# Patient Record
Sex: MALE | Race: BLACK OR AFRICAN AMERICAN | URBAN - METROPOLITAN AREA
[De-identification: names, ages, dates, MRNs, and addresses within clinical notes are randomized per-mention and may not be internally consistent; named-entity substitution may affect disease eponyms.]

---

## 2020-08-31 ENCOUNTER — EMERGENCY (EMERGENCY)
Facility: HOSPITAL | Age: 45
LOS: 1 days | Discharge: HOME | End: 2020-08-31
Attending: EMERGENCY MEDICINE | Admitting: EMERGENCY MEDICINE
Payer: OTHER MISCELLANEOUS

## 2020-08-31 VITALS
OXYGEN SATURATION: 96 % | WEIGHT: 179.9 LBS | DIASTOLIC BLOOD PRESSURE: 91 MMHG | RESPIRATION RATE: 18 BRPM | SYSTOLIC BLOOD PRESSURE: 128 MMHG | TEMPERATURE: 99 F | HEART RATE: 92 BPM

## 2020-08-31 PROCEDURE — 99284 EMERGENCY DEPT VISIT MOD MDM: CPT

## 2020-08-31 NOTE — ED PROVIDER NOTE - PATIENT PORTAL LINK FT
You can access the FollowMyHealth Patient Portal offered by [facility] by registering at the following website: [email or web address]. By joining [facility] FollowMyHealth portal, you will also be able to view your health information using other applications (apps) compatible with our system.

## 2020-08-31 NOTE — ED PROVIDER NOTE - OBJECTIVE STATEMENT
45 y/o male with no pertinent PMHx or smoking history. Patient is a , and someone in his unit recently tested positive for COVID. Patient does not know who this person is, so he is not sure whether he has been in contact with them, but was told to get tested. Three days ago, Patient developed a "scratchy" throat and runny nose. Denies fever, chills, chest pain, SOB. No other known sick contacts. Entire household is well. No travel history. Has been taking Sudafed with relief in symptoms.

## 2020-08-31 NOTE — ED ADULT NURSE NOTE - NSIMPLEMENTINTERV_GEN_ALL_ED
Implemented All Universal Safety Interventions:  [place] to call system. Call bell, personal items and telephone within reach. Instruct patient to call for assistance. Room bathroom lighting operational. Non-slip footwear when patient is off stretcher. Physically safe environment: no spills, clutter or unnecessary equipment. Stretcher in lowest position, wheels locked, appropriate side rails in place.

## 2020-08-31 NOTE — ED PROVIDER NOTE - NSFOLLOWUPINSTRUCTIONS_ED_ALL_ED_FT
COVID-19  COVID-19 is a respiratory infection that is caused by a virus called severe acute respiratory syndrome coronavirus 2 (SARS-CoV-2). The disease is also known as coronavirus disease or novel coronavirus. In some people, the virus may not cause any symptoms. In others, it may cause a serious infection. The infection can get worse quickly and can lead to complications, such as:  Pneumonia, or infection of the lungs.Acute respiratory distress syndrome or ARDS. This is fluid build-up in the lungs.Acute respiratory failure. This is a condition in which there is not enough oxygen passing from the lungs to the body.Sepsis or septic shock. This is a serious bodily reaction to an infection.Blood clotting problems.Secondary infections due to bacteria or fungus.The virus that causes COVID-19 is contagious. This means that it can spread from person to person through droplets from coughs and sneezes (respiratory secretions).  What are the causes?  This illness is caused by a virus. You may catch the virus by:  Breathing in droplets from an infected person's cough or sneeze.Touching something, like a table or a doorknob, that was exposed to the virus (contaminated) and then touching your mouth, nose, or eyes.What increases the risk?  Risk for infection     You are more likely to be infected with this virus if you:  Live in or travel to an area with a COVID-19 outbreak.Come in contact with a sick person who recently traveled to an area with a COVID-19 outbreak.Provide care for or live with a person who is infected with COVID-19.Risk for serious illness    You are more likely to become seriously ill from the virus if you:  Are 65 years of age or older.Have a long-term disease that lowers your body's ability to fight infection (immunocompromised).Live in a nursing home or long-term care facility.Have a long-term (chronic) disease such as:  Chronic lung disease, including chronic obstructive pulmonary disease or asthmaHeart disease.Diabetes.Chronic kidney disease.Liver disease.Are obese.What are the signs or symptoms?  Symptoms of this condition can range from mild to severe. Symptoms may appear any time from 2 to 14 days after being exposed to the virus. They include:  A fever.A cough.Difficulty breathing.Chills.Muscle pains.A sore throat.Loss of taste or smell.Some people may also have stomach problems, such as nausea, vomiting, or diarrhea.  Other people may not have any symptoms of COVID-19.  How is this diagnosed?  This condition may be diagnosed based on:  Your signs and symptoms, especially if:  You live in an area with a COVID-19 outbreak.You recently traveled to or from an area where the virus is common.You provide care for or live with a person who was diagnosed with COVID-19.A physical exam.Lab tests, which may include:  A nasal swab to take a sample of fluid from your nose.A throat swab to take a sample of fluid from your throat.A sample of mucus from your lungs (sputum).Blood tests.Imaging tests, which may include, X-rays, CT scan, or ultrasound.How is this treated?  At present, there is no medicine to treat COVID-19. Medicines that treat other diseases are being used on a trial basis to see if they are effective against COVID-19.  Your health care provider will talk with you about ways to treat your symptoms. For most people, the infection is mild and can be managed at home with rest, fluids, and over-the-counter medicines.  Treatment for a serious infection usually takes places in a hospital intensive care unit (ICU). It may include one or more of the following treatments. These treatments are given until your symptoms improve.  Receiving fluids and medicines through an IV.Supplemental oxygen. Extra oxygen is given through a tube in the nose, a face mask, or a garcia.Positioning you to lie on your stomach (prone position). This makes it easier for oxygen to get into the lungs.Continuous positive airway pressure (CPAP) or bi-level positive airway pressure (BPAP) machine. This treatment uses mild air pressure to keep the airways open. A tube that is connected to a motor delivers oxygen to the body.Ventilator. This treatment moves air into and out of the lungs by using a tube that is placed in your windpipe.Tracheostomy. This is a procedure to create a hole in the neck so that a breathing tube can be inserted.Extracorporeal membrane oxygenation (ECMO). This procedure gives the lungs a chance to recover by taking over the functions of the heart and lungs. It supplies oxygen to the body and removes carbon dioxide.Follow these instructions at home:  Lifestyle     If you are sick, stay home except to get medical care. Your health care provider will tell you how long to stay home. Call your health care provider before you go for medical care.Rest at home as told by your health care provider.Do not use any products that contain nicotine or tobacco, such as cigarettes, e-cigarettes, and chewing tobacco. If you need help quitting, ask your health care provider.Return to your normal activities as told by your health care provider. Ask your health care provider what activities are safe for you.General instructions     Take over-the-counter and prescription medicines only as told by your health care provider.Drink enough fluid to keep your urine pale yellow.Keep all follow-up visits as told by your health care provider. This is important.How is this prevented?     There is no vaccine to help prevent COVID-19 infection. However, there are steps you can take to protect yourself and others from this virus.  To protect yourself:     Do not travel to areas where COVID-19 is a risk. The areas where COVID-19 is reported change often. To identify high-risk areas and travel restrictions, check the CDC travel website: wwwnc.cdc.gov/travel/noticesIf you live in, or must travel to, an area where COVID-19 is a risk, take precautions to avoid infection.  Stay away from people who are sick.Wash your hands often with soap and water for 20 seconds. If soap and water are not available, use an alcohol-based hand .Avoid touching your mouth, face, eyes, or nose.Avoid going out in public, follow guidance from your state and local health authorities.If you must go out in public, wear a cloth face covering or face mask.Disinfect objects and surfaces that are frequently touched every day. This may include:  Counters and tables.Doorknobs and light switches.Sinks and faucets.Electronics, such as phones, remote controls, keyboards, computers, and tablets.To protect others:     If you have symptoms of COVID-19, take steps to prevent the virus from spreading to others.  If you think you have a COVID-19 infection, contact your health care provider right away. Tell your health care team that you think you may have a COVID-19 infection.Stay home. Leave your house only to seek medical care. Do not use public transport.Do not travel while you are sick.Wash your hands often with soap and water for 20 seconds. If soap and water are not available, use alcohol-based hand .Stay away from other members of your household. Let healthy household members care for children and pets, if possible. If you have to care for children or pets, wash your hands often and wear a mask. If possible, stay in your own room, separate from others. Use a different bathroom.Make sure that all people in your household wash their hands well and often.Cough or sneeze into a tissue or your sleeve or elbow. Do not cough or sneeze into your hand or into the air.Wear a cloth face covering or face mask.Where to find more information  Centers for Disease Control and Prevention: www.cdc.gov/coronavirus/2019-ncov/index.htmlW[facility] Health Organization: www.who.int/health-topics/coronavirusContact a health care provider if:  You live in or have traveled to an area where COVID-19 is a risk and you have symptoms of the infection.You have had contact with someone who has COVID-19 and you have symptoms of the infection.Get help right away if:  You have trouble breathing.You have pain or pressure in your chest.You have confusion.You have bluish lips and fingernails.You have difficulty waking from sleep.You have symptoms that get worse.These symptoms may represent a serious problem that is an emergency. Do not wait to see if the symptoms will go away. Get medical help right away. Call your local emergency services (911 in the [place] Do not drive yourself to the hospital. Let the emergency medical personnel know if you think you have COVID-19.   Summary  COVID-19 is a respiratory infection that is caused by a virus. It is also known as coronavirus disease or novel coronavirus. It can cause serious infections, such as pneumonia, acute respiratory distress syndrome, acute respiratory failure, or sepsis.The virus that causes COVID-19 is contagious. This means that it can spread from person to person through droplets from coughs and sneezes.You are more likely to develop a serious illness if you are 65 years of age or older, have a weak immunity, live in a nursing home, or have chronic disease.There is no medicine to treat COVID-19. Your health care provider will talk with you about ways to treat your symptoms.Take steps to protect yourself and others from infection. Wash your hands often and disinfect objects and surfaces that are frequently touched every day. Stay away from people who are sick and wear a mask if you are sick.This information is not intended to replace advice given to you by your health care provider. Make sure you discuss any questions you have with your health care provider.    Document Released: 01/23/2020 Document Revised: 05/14/2020 Document Reviewed: 01/23/2020  Elsevier Patient Education © 2020 [employer]

## 2020-08-31 NOTE — ED PROVIDER NOTE - HISTORY ATTESTATION, MLM
I have reviewed and confirmed nurses' notes... Rifampin Counseling: I discussed with the patient the risks of rifampin including but not limited to liver damage, kidney damage, red-orange body fluids, nausea/vomiting and severe allergy.

## 2020-08-31 NOTE — ED ADULT TRIAGE NOTE - CHIEF COMPLAINT QUOTE
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 Now has congestion sore throat, cough, since Saturday.

## 2020-09-01 LAB — SARS-COV-2 RNA SPEC QL NAA+PROBE: SIGNIFICANT CHANGE UP

## 2020-09-03 DIAGNOSIS — J06.9 ACUTE UPPER RESPIRATORY INFECTION, UNSPECIFIED: ICD-10-CM

## 2020-09-03 DIAGNOSIS — R07.0 PAIN IN THROAT: ICD-10-CM

## 2025-05-13 NOTE — ED ADULT TRIAGE NOTE - MODE OF ARRIVAL
Annual home visit completed today.   Completed [facility] assessment, OBRA and Support Plan. Clt continues to have weakness especially to his right side.  He is a fall risk and recently fell twice, once at home and while out of his apt.  He has been going to PT weekly. He reports he plans to travel to [place] in the next few days and will be gone for about 2 months. Current services include [facility], homemaking and [facility]. Equipment include cane, walker, shower chair, hh shower, bathroom grab bars, lift chair. Safe disposal of meds discussed and copy of sites as well as list of CS Providers and [facility] information brochure was provided to member. [employer] education handout was discussed and provided to member.  CC reviewed and received expressed/verbal approval by member of the care plan, including choice of services and providers, choices related to sharing the plan or portions of the plan with others, appeals rights, and data privacy information.  Revisit in six months.    Walk in